# Patient Record
Sex: MALE | Race: BLACK OR AFRICAN AMERICAN | NOT HISPANIC OR LATINO | URBAN - METROPOLITAN AREA
[De-identification: names, ages, dates, MRNs, and addresses within clinical notes are randomized per-mention and may not be internally consistent; named-entity substitution may affect disease eponyms.]

---

## 2020-05-29 ENCOUNTER — EMERGENCY (EMERGENCY)
Facility: HOSPITAL | Age: 45
LOS: 1 days | Discharge: HOME | End: 2020-05-29
Attending: EMERGENCY MEDICINE | Admitting: EMERGENCY MEDICINE
Payer: OTHER MISCELLANEOUS

## 2020-05-29 VITALS
HEART RATE: 99 BPM | SYSTOLIC BLOOD PRESSURE: 173 MMHG | DIASTOLIC BLOOD PRESSURE: 102 MMHG | OXYGEN SATURATION: 99 % | RESPIRATION RATE: 16 BRPM

## 2020-05-29 VITALS
RESPIRATION RATE: 18 BRPM | TEMPERATURE: 99 F | HEART RATE: 102 BPM | OXYGEN SATURATION: 99 % | DIASTOLIC BLOOD PRESSURE: 104 MMHG | SYSTOLIC BLOOD PRESSURE: 182 MMHG

## 2020-05-29 LAB
ALBUMIN SERPL ELPH-MCNC: 4.7 G/DL — SIGNIFICANT CHANGE UP (ref 3.3–5)
ALP SERPL-CCNC: 62 U/L — SIGNIFICANT CHANGE UP (ref 40–120)
ALT FLD-CCNC: 23 U/L — SIGNIFICANT CHANGE UP (ref 4–41)
ANION GAP SERPL CALC-SCNC: 15 MMO/L — HIGH (ref 7–14)
AST SERPL-CCNC: 30 U/L — SIGNIFICANT CHANGE UP (ref 4–40)
BASOPHILS # BLD AUTO: 0 K/UL — SIGNIFICANT CHANGE UP (ref 0–0.2)
BASOPHILS NFR BLD AUTO: 0 % — SIGNIFICANT CHANGE UP (ref 0–2)
BILIRUB SERPL-MCNC: 0.5 MG/DL — SIGNIFICANT CHANGE UP (ref 0.2–1.2)
BUN SERPL-MCNC: 7 MG/DL — SIGNIFICANT CHANGE UP (ref 7–23)
CALCIUM SERPL-MCNC: 9.5 MG/DL — SIGNIFICANT CHANGE UP (ref 8.4–10.5)
CHLORIDE SERPL-SCNC: 100 MMOL/L — SIGNIFICANT CHANGE UP (ref 98–107)
CO2 SERPL-SCNC: 22 MMOL/L — SIGNIFICANT CHANGE UP (ref 22–31)
CREAT SERPL-MCNC: 0.7 MG/DL — SIGNIFICANT CHANGE UP (ref 0.5–1.3)
EOSINOPHIL # BLD AUTO: 0.12 K/UL — SIGNIFICANT CHANGE UP (ref 0–0.5)
EOSINOPHIL NFR BLD AUTO: 2.1 % — SIGNIFICANT CHANGE UP (ref 0–6)
GLUCOSE SERPL-MCNC: 114 MG/DL — HIGH (ref 70–99)
HCT VFR BLD CALC: 42.7 % — SIGNIFICANT CHANGE UP (ref 39–50)
HGB BLD-MCNC: 14.5 G/DL — SIGNIFICANT CHANGE UP (ref 13–17)
IMM GRANULOCYTES NFR BLD AUTO: 0.3 % — SIGNIFICANT CHANGE UP (ref 0–1.5)
LYMPHOCYTES # BLD AUTO: 0.26 K/UL — LOW (ref 1–3.3)
LYMPHOCYTES # BLD AUTO: 4.5 % — LOW (ref 13–44)
MCHC RBC-ENTMCNC: 32.7 PG — SIGNIFICANT CHANGE UP (ref 27–34)
MCHC RBC-ENTMCNC: 34 % — SIGNIFICANT CHANGE UP (ref 32–36)
MCV RBC AUTO: 96.4 FL — SIGNIFICANT CHANGE UP (ref 80–100)
MONOCYTES # BLD AUTO: 0.04 K/UL — SIGNIFICANT CHANGE UP (ref 0–0.9)
MONOCYTES NFR BLD AUTO: 0.7 % — LOW (ref 2–14)
NEUTROPHILS # BLD AUTO: 5.29 K/UL — SIGNIFICANT CHANGE UP (ref 1.8–7.4)
NEUTROPHILS NFR BLD AUTO: 92.4 % — HIGH (ref 43–77)
NRBC # FLD: 0 K/UL — SIGNIFICANT CHANGE UP (ref 0–0)
PLATELET # BLD AUTO: 231 K/UL — SIGNIFICANT CHANGE UP (ref 150–400)
PMV BLD: 9.1 FL — SIGNIFICANT CHANGE UP (ref 7–13)
POTASSIUM SERPL-MCNC: 5.1 MMOL/L — SIGNIFICANT CHANGE UP (ref 3.5–5.3)
POTASSIUM SERPL-SCNC: 5.1 MMOL/L — SIGNIFICANT CHANGE UP (ref 3.5–5.3)
PROT SERPL-MCNC: 7.1 G/DL — SIGNIFICANT CHANGE UP (ref 6–8.3)
RBC # BLD: 4.43 M/UL — SIGNIFICANT CHANGE UP (ref 4.2–5.8)
RBC # FLD: 13.3 % — SIGNIFICANT CHANGE UP (ref 10.3–14.5)
SODIUM SERPL-SCNC: 137 MMOL/L — SIGNIFICANT CHANGE UP (ref 135–145)
WBC # BLD: 5.73 K/UL — SIGNIFICANT CHANGE UP (ref 3.8–10.5)
WBC # FLD AUTO: 5.73 K/UL — SIGNIFICANT CHANGE UP (ref 3.8–10.5)

## 2020-05-29 PROCEDURE — 93010 ELECTROCARDIOGRAM REPORT: CPT

## 2020-05-29 PROCEDURE — 71046 X-RAY EXAM CHEST 2 VIEWS: CPT | Mod: 26

## 2020-05-29 PROCEDURE — 99283 EMERGENCY DEPT VISIT LOW MDM: CPT

## 2020-05-29 RX ORDER — OXYCODONE AND ACETAMINOPHEN 5; 325 MG/1; MG/1
1 TABLET ORAL ONCE
Refills: 0 | Status: DISCONTINUED | OUTPATIENT
Start: 2020-05-29 | End: 2020-05-29

## 2020-05-29 RX ORDER — DIPHENHYDRAMINE HCL 50 MG
25 CAPSULE ORAL ONCE
Refills: 0 | Status: COMPLETED | OUTPATIENT
Start: 2020-05-29 | End: 2020-05-29

## 2020-05-29 RX ADMIN — OXYCODONE AND ACETAMINOPHEN 1 TABLET(S): 5; 325 TABLET ORAL at 13:03

## 2020-05-29 RX ADMIN — Medication 25 MILLIGRAM(S): at 13:04

## 2020-05-29 NOTE — ED PROVIDER NOTE - ATTENDING CONTRIBUTION TO CARE
Dr Bloch, ATTENDING MD-  I performed a face to face bedside interview with patient regarding history of present illness, review of symptoms and past medical history. I completed an independent physical exam.  I have discussed patient's plan of care with PA.   Documentation as above in the note.  Patient well appearing though in pain, ambulating without  SOB. HEENT nml heart sounds nml lungs clear, abd soft nontender, mod tenderness, lower right ribs. mild skin erythema. neuro nml.

## 2020-05-29 NOTE — ED PROVIDER NOTE - NSFOLLOWUPINSTRUCTIONS_ED_ALL_ED_FT
Follow up with your primary care doctor within 1 week, have your blood pressure checked  Take Motrin 600mg every 6 hours as needed for pain, take with food  For severe pain take Percocet 5mg (1 tablet) every 6 hours, caution drowsiness do not drive or drink alcohol while taking  Continue to use incentive spirometer  Take Benadryl 25mg every 8 hours as needed for itching  Return to the ER with any worsening or concerning symptoms, swelling to lips/tongue, worsening pain, fever, shortness of breath or any other concerns.

## 2020-05-29 NOTE — ED ADULT TRIAGE NOTE - CHIEF COMPLAINT QUOTE
s/p fall off train yesterday had ct yesterday had allergic reaction to contrast dye @ that time was treated @ time pt was found to have rib fractures right side

## 2020-05-29 NOTE — ED PROVIDER NOTE - OBJECTIVE STATEMENT
44yM w/ right sided 7-8th rib fracture sustained from fall yesterday p/w itching and pain to right back. 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 He went to  and had a CT chest/abd/pelvis performed, reports he had an allergic reaction to the contrast with hives and tongue swelling. He was given solumedrol, pepcid, benadryl and an "injection" into his thigh. Pt states he was told he had 2 rib fractures and to come to the ER but he did not. He was prescribed lidoderm patch, motrin and flexeril. He states at 1am he woke with pain in the right mid back and itching. Pt applied topical benadryl to abdomen and chest with minimal relief. Pt denies fever/chills, abdominal pain, vomiting, throat swelling/lip swelling at present, difficulty speaking or swelling, shortness of breath, weakness, headache, dizziness or any other concerns.

## 2020-05-29 NOTE — ED PROVIDER NOTE - PROGRESS NOTE DETAILS
MARKO Quiroz: Labs reviewed with attg, pt reports significant improvement in pain, will d/c home, pt agrees with this plan

## 2020-05-29 NOTE — ED PROVIDER NOTE - PHYSICAL EXAMINATION
Skin: large ecchymoses to right lower thoracic back with bony tenderness along ribs, no midline spinal tenderness

## 2020-05-29 NOTE — ED ADULT NURSE REASSESSMENT NOTE - NS ED NURSE REASSESS COMMENT FT1
Pt. states that post medication administration his right rib pain is now a 5/10, and the itchiness to his abdomen and chest decreased. No further complaints at this time.

## 2020-05-29 NOTE — ED ADULT NURSE NOTE - NSIMPLEMENTINTERV_GEN_ALL_ED
Implemented All Fall with Harm Risk Interventions:  Reserve to call system. Call bell, personal items and telephone within reach. Instruct patient to call for assistance. Room bathroom lighting operational. Non-slip footwear when patient is off stretcher. Physically safe environment: no spills, clutter or unnecessary equipment. Stretcher in lowest position, wheels locked, appropriate side rails in place. Provide visual cue, wrist band, yellow gown, etc. Monitor gait and stability. Monitor for mental status changes and reorient to person, place, and time. Review medications for side effects contributing to fall risk. Reinforce activity limits and safety measures with patient and family. Provide visual clues: red socks.

## 2020-05-29 NOTE — ED PROVIDER NOTE - CLINICAL SUMMARY MEDICAL DECISION MAKING FREE TEXT BOX
44yM w/ right sided 7-8th rib fracture sustained from fall yesterday p/w itching and pain to right back. Pt with contrast reaction yesterday. Will check cbc/cmp given tachycardia, although likely due to pain. Will trial percocet, benadryl. Will reassess.

## 2020-05-29 NOTE — ED ADULT NURSE NOTE - OBJECTIVE STATEMENT
44y male pt. came to ED today for c/o itching to chest/abdomen s/p allergic reaction yesterday to contrast dye. He also endorses having 10/10 right ribs pain. Pt. states that he had accidentally fallen off of a train at work yesterday and had received right rib fractures. During the CT with dye he states that he had developed generalized itching, hives and states that his tongue was swelling. As per pt., he was given methylprednisolone and benadryl which helped. He now has itching to chest and abdomen. No new hives noted to chest, abdomen and back. Pt. states that the bumps present were there from yesterday 44y male pt. came to ED today for c/o itching to chest/abdomen s/p allergic reaction yesterday to contrast dye. He also endorses having 10/10 right ribs pain. Pt. states that he had accidentally fallen off of a train at work yesterday and had received right rib fractures. During the CT with dye he states that he had developed generalized itching, hives and states that his tongue was swelling. As per pt., he was given methylprednisolone and benadryl which helped. He now has itching to chest and abdomen. No new hives noted to chest, abdomen and back. Pt. states that the bumps present were there from yesterday. There is a palm-sized red bruise to his right ribcage which is where the pain is present. Pt. is HTN with sbp in 180s. MD aware, states that the HTN is probably pain related. Pain medication to be ordered and given. He is A&Ox3 and ambulatory at baseline. Denies any SOB, N/V/D, HA, numbness/tingling or dizziness. IV 20G LFA inserted, labs drawn and sent as ordered. 44y male pt. came to ED today for c/o itching to chest/abdomen s/p allergic reaction yesterday to contrast dye. He also endorses having 10/10 right ribs pain. Pt. states that he had accidentally fallen off of a train at work yesterday and had received right rib fractures. During the CT with dye he states that he had developed generalized itching, hives and states that his tongue was swelling. As per pt., he was given methylprednisolone and benadryl which helped. He now has itching to chest and abdomen. No new hives noted to chest, abdomen and back. Pt. states that the bumps present were there from yesterday. There is a palm-sized red bruise to his right ribcage which is where the pain is present. Pt. is HTN with sbp in 180s. MD Quiroz aware, states that the HTN is probably pain related. Pain medication to be ordered and given. He is A&Ox3 and ambulatory at baseline. Denies any SOB, N/V/D, HA, numbness/tingling or dizziness. IV 20G LFA inserted, labs drawn and sent as ordered.

## 2020-05-29 NOTE — ED PROVIDER NOTE - PATIENT PORTAL LINK FT
You can access the FollowMyHealth Patient Portal offered by [facility] by registering at the following website: [email or web address]. By joining [employer]’s FollowMyHealth portal, you will also be able to view your health information using other applications (apps) compatible with our system.
